# Patient Record
Sex: FEMALE | Race: WHITE | NOT HISPANIC OR LATINO | URBAN - METROPOLITAN AREA
[De-identification: names, ages, dates, MRNs, and addresses within clinical notes are randomized per-mention and may not be internally consistent; named-entity substitution may affect disease eponyms.]

---

## 2017-06-27 ENCOUNTER — OFFICE VISIT - RIVER FALLS (OUTPATIENT)
Dept: FAMILY MEDICINE | Facility: CLINIC | Age: 22
End: 2017-06-27

## 2017-06-27 ASSESSMENT — MIFFLIN-ST. JEOR: SCORE: 1414.71

## 2019-12-10 ENCOUNTER — AMBULATORY - RIVER FALLS (OUTPATIENT)
Dept: FAMILY MEDICINE | Facility: CLINIC | Age: 24
End: 2019-12-10

## 2020-03-17 ENCOUNTER — AMBULATORY - RIVER FALLS (OUTPATIENT)
Dept: FAMILY MEDICINE | Facility: CLINIC | Age: 25
End: 2020-03-17

## 2020-08-17 ENCOUNTER — AMBULATORY - RIVER FALLS (OUTPATIENT)
Dept: FAMILY MEDICINE | Facility: CLINIC | Age: 25
End: 2020-08-17

## 2022-02-11 VITALS
HEART RATE: 68 BPM | SYSTOLIC BLOOD PRESSURE: 110 MMHG | TEMPERATURE: 98.1 F | WEIGHT: 158.6 LBS | BODY MASS INDEX: 29.19 KG/M2 | HEIGHT: 62 IN | DIASTOLIC BLOOD PRESSURE: 64 MMHG

## 2022-02-16 NOTE — PROGRESS NOTES
Patient:   VANIA WILSON            MRN: [id number]            FIN: [id number]               Age:   22 years     Sex:  Female     :  1995   Associated Diagnoses:   School physical exam   Author:   Khalif Chandler MD      Visit Information      Date of Service: 2017 09:38 am  Performing Location: [facility]  Encounter#: [id number]      Primary Care Provider (PCP):  Khalif Chandler MD    NPI# [id number]   Visit type:  School physical.    Paperwork needed for school forms/ order.  .    Accompanied by:  No one.    Source of history:  Self, Medical record.    History limitation:  None.       Chief Complaint   2017 10:03 AM CDT   here for travel px--will be going to [facility] this  for study abroad.      History of Present Illness             The patient presents for a general exam.  The patient's general health status is described as good.  The patient's diet is described as balanced.  Exercise: routine.  Associated symptoms consist of none.  Medical encounters: none.  Compliance problems: none.  Additional pertinent history: daily caffeine use, tobacco use none and no alcohol use.     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      Review of Systems   Constitutional:  No fever, No chills, No sweats, No weakness, No fatigue.    Eye:  No recent visual problem.    Ear/Nose/Mouth/Throat:  No decreased hearing, No nasal congestion, No sore throat.    Respiratory:  No shortness of breath, No cough.    Cardiovascular:  Negative, No chest pain, No palpitations, No peripheral edema.    Gastrointestinal:  No nausea, No vomiting, No diarrhea, No constipation, No heartburn.    Genitourinary:  No dysuria, No change in urine stream.    Hematology/Lymphatics:  No bruising tendency, No bleeding tendency.    Endocrine:  No cold intolerance, No heat intolerance.    Immunologic:  Negative.    Musculoskeletal:  No back pain, No neck pain, No joint pain, No muscle pain.     Integumentary:  No rash, No dryness, No skin lesion.    Neurologic:  Alert and oriented X4, No headache.    Psychiatric:  No anxiety, No depression.       Health Status   Allergies:    Allergic Reactions (Selected)  Severity Not Documented  Azithromycin (No reactions were documented)  No known allergies (No reactions were documented)   Problem list:    All Problems (Selected)  Co-occurrence of multiple mental disorders / SNOMED CT 823280601 / Confirmed  Headache / SNOMED CT 12545158 / Confirmed  Intracranial injury / SNOMED CT 311796 / Confirmed  Morbid Obesity / ICD-9-.01 / Probable      Histories   Past Medical History:    Active  Intracranial injury (497358): Onset on 8/22/2012 at 17 years.  Co-occurrence of multiple mental disorders (945458668): Onset on 8/22/2012 at 17 years.  Headache (66234948): Onset on 8/22/2012 at 17 years.  Resolved  Otalgia, Unspecified (388.70): Onset on 3/5/2008 at 12 years.  Resolved.  Viral Warts, Unspecified (078.10):  Resolved.  Comments:      -   Left hand, 2009 and 2010.   Family History:    No family history items have been selected or recorded.   Procedure history:    No active procedure history items have been selected or recorded.   Social History:             No active social history items have been recorded.      Physical Examination   Vital Signs   6/27/2017 10:03 AM CDT Temperature Tympanic 98.1 DegF    Peripheral Pulse Rate 68 bpm    Pulse Site Radial artery    HR Method Manual    Systolic Blood Pressure 110 mmHg    Diastolic Blood Pressure 64 mmHg    Mean Arterial Pressure 79 mmHg    BP Site Left arm    BP Method Manual      Measurements from flowsheet : Measurements   6/27/2017 10:03 AM CDT Height Measured - Standard 61.5 in    Weight Measured - Standard 158.6 lb    BSA 1.77 m2    Body Mass Index 29.48 kg/m2      General:  Alert and oriented, No acute distress.    Eye:  Pupils are equal, round and reactive to light, Extraocular movements are intact, Normal  conjunctiva.    HENT:  Normocephalic, Tympanic membranes are clear, Oral mucosa is moist, No pharyngeal erythema, No sinus tenderness.    Neck:  Supple, Non-tender, No carotid bruit, No lymphadenopathy, No thyromegaly.    Respiratory:  Lungs are clear to auscultation, Respirations are non-labored, Breath sounds are equal, No chest wall tenderness.    Cardiovascular:  Normal rate, Regular rhythm, No murmur, No gallop, Normal peripheral perfusion, No edema.    Gastrointestinal:  Soft, Non-tender, No organomegaly.    Musculoskeletal:  Normal range of motion, Normal strength, No swelling, No deformity.    Integumentary:  Warm, Dry, No rash.    Neurologic:  Alert, Oriented, No focal deficits.    Psychiatric:  Cooperative, Appropriate mood & affect.       Impression and Plan   Diagnosis     School physical exam (MLY22-NW Z02.0).     Course:  Progressing as expected.    Plan:  forms filled out and signed.

## 2022-02-16 NOTE — LETTER
(Inserted Image. Unable to display)   February 11, 2020        VANIA WILSON  [address], [place] [id number]        Dear VANIA,      Thank you for selecting [facility] for your healthcare needs.    Our records indicate you are due for the following services:     Immunizations: Gardasil(HPV) Series #2 Vaccine    To schedule an appointment or if you have further questions, please contact your primary clinic:   [facility]       [phone]   [facility]       [phone]              [facility]     [phone]      Powered by enrich-in    Sincerely,    Khalif Chandler M.D.